# Patient Record
Sex: FEMALE | URBAN - METROPOLITAN AREA
[De-identification: names, ages, dates, MRNs, and addresses within clinical notes are randomized per-mention and may not be internally consistent; named-entity substitution may affect disease eponyms.]

---

## 2019-09-12 ENCOUNTER — APPOINTMENT (OUTPATIENT)
Age: 74
Setting detail: DERMATOLOGY
End: 2019-09-12

## 2019-09-12 DIAGNOSIS — L57.8 OTHER SKIN CHANGES DUE TO CHRONIC EXPOSURE TO NONIONIZING RADIATION: ICD-10-CM

## 2019-09-12 PROBLEM — C44.1192 BASAL CELL CARCINOMA OF SKIN OF LEFT LOWER EYELID, INCLUDING CANTHUS: Status: ACTIVE | Noted: 2019-09-12

## 2019-09-12 PROBLEM — I63.50 CEREBRAL INFARCTION DUE TO UNSPECIFIED OCCLUSION OR STENOSIS OF UNSPECIFIED CEREBRAL ARTERY: Status: ACTIVE | Noted: 2019-09-12

## 2019-09-12 PROBLEM — I10 ESSENTIAL (PRIMARY) HYPERTENSION: Status: ACTIVE | Noted: 2019-09-12

## 2019-09-12 PROCEDURE — OTHER COUNSELING: OTHER

## 2019-09-12 PROCEDURE — OTHER MIPS QUALITY: OTHER

## 2019-09-12 PROCEDURE — OTHER CONSULTATION FOR RADIOTHERAPY: OTHER

## 2019-09-12 PROCEDURE — OTHER CONSULTATION FOR MOHS SURGERY: OTHER

## 2019-09-12 PROCEDURE — OTHER REFERRAL: OTHER

## 2019-09-12 PROCEDURE — 99203 OFFICE O/P NEW LOW 30 MIN: CPT

## 2019-09-12 ASSESSMENT — LOCATION DETAILED DESCRIPTION DERM: LOCATION DETAILED: LEFT INFERIOR MEDIAL FOREHEAD

## 2019-09-12 ASSESSMENT — LOCATION SIMPLE DESCRIPTION DERM: LOCATION SIMPLE: LEFT FOREHEAD

## 2019-09-12 ASSESSMENT — LOCATION ZONE DERM: LOCATION ZONE: FACE

## 2019-09-12 NOTE — PROCEDURE: COUNSELING
Detail Level: Detailed
Detail Level: Zone
Patient Specific Counseling (Will Not Stick From Patient To Patient): Discussed starting Erivedge to shrink tumor down prior to discussing whether Mohs and/SRT will be needed for complete removal. \\n\\nPlan: Start Erivedge and re-evaluate in 4 weeks. Discussed potential side effects of muscle spasms, hair loss and lack of taste.

## 2019-11-07 ENCOUNTER — APPOINTMENT (OUTPATIENT)
Age: 74
Setting detail: DERMATOLOGY
End: 2019-11-07

## 2019-11-07 PROBLEM — E13.9 OTHER SPECIFIED DIABETES MELLITUS WITHOUT COMPLICATIONS: Status: ACTIVE | Noted: 2019-11-07

## 2019-11-07 PROBLEM — C44.1192 BASAL CELL CARCINOMA OF SKIN OF LEFT LOWER EYELID, INCLUDING CANTHUS: Status: ACTIVE | Noted: 2019-11-07

## 2019-11-07 PROCEDURE — OTHER TREATMENT REGIMEN: OTHER

## 2019-11-07 PROCEDURE — OTHER MIPS QUALITY: OTHER

## 2019-11-07 PROCEDURE — OTHER OBSERVATION: OTHER

## 2019-11-07 PROCEDURE — OTHER OBSERVATION AND MEASURE: OTHER

## 2019-11-07 PROCEDURE — 99213 OFFICE O/P EST LOW 20 MIN: CPT

## 2019-11-07 PROCEDURE — OTHER OTHER: OTHER

## 2019-11-07 PROCEDURE — OTHER COUNSELING: OTHER

## 2019-11-07 NOTE — PROCEDURE: TREATMENT REGIMEN
Detail Level: Zone
Continue Regimen: Erivedge daily
Plan: Will check back in 6 weeks to determine size and efficacy of medication. Plan for Mohs in future when lesion is smaller in size. Counseled patient to call if side effects become apparent.

## 2019-11-07 NOTE — PROCEDURE: OTHER
Other (Free Text): Patient has not noticed any issues with side effects. Denies muscle spasms, taste loss or hair loss. Patient is pleased with decrease in size and lesion has not bled since staring medication.
Note Text (......Xxx Chief Complaint.): This diagnosis correlates with the
Detail Level: Zone

## 2019-11-07 NOTE — PROCEDURE: COUNSELING
Patient Specific Counseling (Will Not Stick From Patient To Patient): Discussed starting Erivedge to shrink tumor down prior to discussing whether Mohs and/SRT will be needed for complete removal. \\n\\nPlan: Start Erivedge and re-evaluate in 4 weeks. Discussed potential side effects of muscle spasms, hair loss and lack of taste.
Detail Level: Detailed

## 2019-12-19 ENCOUNTER — APPOINTMENT (OUTPATIENT)
Age: 74
Setting detail: DERMATOLOGY
End: 2019-12-19

## 2019-12-19 PROBLEM — C44.1192 BASAL CELL CARCINOMA OF SKIN OF LEFT LOWER EYELID, INCLUDING CANTHUS: Status: ACTIVE | Noted: 2019-12-19

## 2019-12-19 PROCEDURE — 99213 OFFICE O/P EST LOW 20 MIN: CPT

## 2019-12-19 PROCEDURE — OTHER OBSERVATION: OTHER

## 2019-12-19 PROCEDURE — OTHER TREATMENT REGIMEN: OTHER

## 2019-12-19 PROCEDURE — OTHER COUNSELING: OTHER

## 2019-12-19 PROCEDURE — OTHER MIPS QUALITY: OTHER

## 2019-12-19 PROCEDURE — OTHER OTHER: OTHER

## 2019-12-19 PROCEDURE — OTHER OBSERVATION AND MEASURE: OTHER

## 2019-12-19 NOTE — PROCEDURE: OTHER
Detail Level: Zone
Note Text (......Xxx Chief Complaint.): This diagnosis correlates with the
Other (Free Text): Patient has not noticed any hair loss. She is now experiencing lack of taste and muscle spasms, trunk, back and legs. Patient is pleased with decrease in size and lesion has not bled since staring medication.

## 2019-12-19 NOTE — PROCEDURE: COUNSELING
Patient Specific Counseling (Will Not Stick From Patient To Patient): Discussed starting Erivedge to shrink tumor down prior to discussing whether Mohs and/SRT will be needed for complete removal. \\n\\nPlan: Start Erivedge and re-evaluate in 4 weeks. Discussed potential side effects of muscle spasms, hair loss and lack of taste.